# Patient Record
Sex: MALE | Race: WHITE | NOT HISPANIC OR LATINO | Employment: OTHER | URBAN - METROPOLITAN AREA
[De-identification: names, ages, dates, MRNs, and addresses within clinical notes are randomized per-mention and may not be internally consistent; named-entity substitution may affect disease eponyms.]

---

## 2021-05-29 ENCOUNTER — RECORDS - HEALTHEAST (OUTPATIENT)
Dept: ADMINISTRATIVE | Facility: CLINIC | Age: 71
End: 2021-05-29

## 2022-07-26 ENCOUNTER — TELEPHONE (OUTPATIENT)
Dept: OTOLARYNGOLOGY | Facility: CLINIC | Age: 72
End: 2022-07-26

## 2022-07-26 ENCOUNTER — TRANSCRIBE ORDERS (OUTPATIENT)
Dept: OTHER | Age: 72
End: 2022-07-26

## 2022-07-26 DIAGNOSIS — S04.60XD: Primary | ICD-10-CM

## 2022-07-26 NOTE — TELEPHONE ENCOUNTER
[facility] Call Center    Phone Message    May a detailed message be left on voicemail: yes     Reason for Call: Appointment Intake    Referring Provider Name: Dr Eyad Nichols  Diagnosis and/or Symptoms: Injury of vestibulocochlear nerve      Pt requesting to see Dr. Baugh      Action Taken: Message routed to:  [facility] ENT    Travel Screening: Not Applicable

## 2022-07-29 NOTE — TELEPHONE ENCOUNTER
[facility] Call Center    Phone Message    May a detailed message be left on voicemail: yes     Reason for Call: Other: Pt calling back stating that he hasn't heard from clinic. States that Dr. Nichols has also sent information to Dr. Baugh. Please advise.     Action Taken: Message routed to:  [facility] ENT    Travel Screening: Not Applicable

## 2022-08-16 ENCOUNTER — TELEPHONE (OUTPATIENT)
Dept: OTOLARYNGOLOGY | Facility: CLINIC | Age: 72
End: 2022-08-16

## 2022-08-16 NOTE — TELEPHONE ENCOUNTER
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 Baugh. Per protocol, Dr. Baugh does not see for Dx on ref. Pt has called about getting scheduled and has gotten no call back on how to move forward with making appt. He was told that he would hear back last Friday. Please review and call pt to schedule accordingly.     Thank you     Action Taken: Message routed to:  [facility] ent    Travel Screening: Not Applicable

## 2022-08-17 NOTE — TELEPHONE ENCOUNTER
LVM to schedule next new with Dr. Baugh with [facility] prior.       appt notes : Injury of vestibulocochlear nerve, unspecified laterality, subsequent encounter, Hyperacusis and tinnitus with poor word recognition- Referred by  Dr Eyad Nichols @ [facility]. [facility] prior     Gave call center number

## 2022-08-24 NOTE — TELEPHONE ENCOUNTER
FUTURE VISIT INFORMATION      FUTURE VISIT INFORMATION:    Date: 11/1/22    Time: 9:10AM    Location: [facility]  REFERRAL INFORMATION:    Referring provider:  Dr Eyad Nichols    Referring providers clinic:  [facility]     Reason for visit/diagnosis  New per pt-ref, Injury of vestibulocochlear nerve, unspecified laterality, subsequent encounter, Hyperacusis/tinnitus w/poor word recognition- Referred by  Dr Eyad Nichols @ [facility]. [facility] prior per [facility]    RECORDS REQUESTED FROM:       Clinic name Comments Records Status Imaging Status   [facility]  5/2/22 note from Eyad Nichols MD   Care everywhere     Audiology [facility]   5/3/22 audiogram  Scanned in Care everywhere     [facility] imaging   MRN: [id number] 6/24/22 MR Head Brain IAC  Care everywhere  req 10/7/22 - PACS                        10/7/22 7:43AM sent a fax to [facility] for images - [facility]   10/23/22 9:33AM images received in PACS - [facility]

## 2022-10-27 DIAGNOSIS — Z01.10 ENCOUNTER FOR HEARING TEST: Primary | ICD-10-CM

## 2022-10-31 ENCOUNTER — OFFICE VISIT (OUTPATIENT)
Dept: AUDIOLOGY | Facility: CLINIC | Age: 72
End: 2022-10-31
Payer: COMMERCIAL

## 2022-10-31 DIAGNOSIS — H90.3 SENSORINEURAL HEARING LOSS, BILATERAL: Primary | ICD-10-CM

## 2022-10-31 PROCEDURE — 92550 TYMPANOMETRY & REFLEX THRESH: CPT | Mod: 52 | Performed by: AUDIOLOGIST

## 2022-10-31 PROCEDURE — 92557 COMPREHENSIVE HEARING TEST: CPT | Performed by: AUDIOLOGIST

## 2022-10-31 NOTE — PROGRESS NOTES
AUDIOLOGY REPORT    SUMMARY: Audiology visit completed. See audiogram for results.      RECOMMENDATIONS: Follow-up with ENT.    Jose Dueñas, [facility]  Licensed Audiologist  [place] #[id number]

## 2022-11-01 ENCOUNTER — OFFICE VISIT (OUTPATIENT)
Dept: OTOLARYNGOLOGY | Facility: CLINIC | Age: 72
End: 2022-11-01
Payer: COMMERCIAL

## 2022-11-01 ENCOUNTER — PRE VISIT (OUTPATIENT)
Dept: OTOLARYNGOLOGY | Facility: CLINIC | Age: 72
End: 2022-11-01

## 2022-11-01 VITALS
BODY MASS INDEX: 27.35 KG/M2 | SYSTOLIC BLOOD PRESSURE: 142 MMHG | OXYGEN SATURATION: 97 % | HEIGHT: 70 IN | WEIGHT: 191 LBS | TEMPERATURE: 97.4 F | HEART RATE: 72 BPM | DIASTOLIC BLOOD PRESSURE: 80 MMHG

## 2022-11-01 DIAGNOSIS — Z71.89 ENCOUNTER FOR HEARING AID CONSULTATION: ICD-10-CM

## 2022-11-01 DIAGNOSIS — H93.213 AUDITORY RECRUITMENT OF BOTH EARS: ICD-10-CM

## 2022-11-01 DIAGNOSIS — H90.3 SENSORINEURAL HEARING LOSS, BILATERAL: Primary | ICD-10-CM

## 2022-11-01 PROCEDURE — 99204 OFFICE O/P NEW MOD 45 MIN: CPT | Performed by: OTOLARYNGOLOGY

## 2022-11-01 RX ORDER — RABEPRAZOLE SODIUM 20 MG/1
20 TABLET, DELAYED RELEASE ORAL
COMMUNITY
Start: 2022-10-24

## 2022-11-01 RX ORDER — LORATADINE/PSEUDOEPHEDRINE 10MG-240MG
1 TABLET, EXTENDED RELEASE 24 HR ORAL DAILY
COMMUNITY
Start: 2022-08-04

## 2022-11-01 RX ORDER — FAMOTIDINE 40 MG/1
40 TABLET, FILM COATED ORAL
COMMUNITY
Start: 2022-09-28

## 2022-11-01 RX ORDER — UBIDECARENONE 200 MG
CAPSULE ORAL
COMMUNITY
Start: 2021-07-08

## 2022-11-01 RX ORDER — EPINEPHRINE 0.3 MG/.3ML
INJECTION SUBCUTANEOUS
COMMUNITY
Start: 2022-06-05

## 2022-11-01 RX ORDER — BACLOFEN 20 MG
TABLET ORAL
COMMUNITY
Start: 2021-07-08

## 2022-11-01 RX ORDER — ATORVASTATIN CALCIUM 20 MG/1
TABLET, FILM COATED ORAL
COMMUNITY
Start: 2022-10-24

## 2022-11-01 RX ORDER — SILDENAFIL 100 MG/1
TABLET, FILM COATED ORAL
COMMUNITY
Start: 2022-09-09

## 2022-11-01 ASSESSMENT — PAIN SCALES - GENERAL: PAINLEVEL: NO PAIN (0)

## 2022-11-01 NOTE — LETTER
Hearing Aid Medical Clearance    Luis Palma  November 1, 2022        This patient has received a medical examination and may be considered a suitable candidate for a hearing aid.         Physician:__________________________________________________      Dr. Ileana Baugh

## 2022-11-01 NOTE — LETTER
2022       RE: Luis Palma  [address] [place] [address]     Dear Colleague,    Thank you for referring your patient, Luis Palma, to the [facility] EAR NOSE AND THROAT CLINIC [place] [facility]. Please see a copy of my visit note below.      Neurotology Clinic      Name: Luis Palma  MRN: [id number]  Age: 72 year old  : 1950  Referring provider: Eyad Nichols  2022      Chief Complaint:   Consultation    History of Present Illness:   Luis Palma is a 72 year old male with a history of bilateral SNHL who presents for consultation regarding injury to vestibulocochlear nerve and tinnitus with poor word recognition. Consultation was requested by Eyad Nichols. The patient was evaluated by Dr. Nichols on 22 and endorsed tinnitus, decreased hearing, and hearing static after going to a gun range on May 1st. He had been wearing his hearing aids at this time, as well as hearing protection head phones. Patient was started on prednisone. He was re-evaluated on 22 after his audiogram demonstrated poor word recognition: 52% on left, 60% on right. Based on that audiogram, his SNHL was stable compared to 2022, but no previous word rec scores to compare to.  At this time, he had no improvement of symptoms after prednisone and reported difficulty understanding his wife as well as others. He also heard only buzzing when listening to music.     Today, he says he feels both his overall hearing in terms of volume is worsened, as well as speech discrimination. Conversations are difficult to understand due to a buzzing sound with speech, and music sounds off pitch and is also drowned out by buzzing sounds. He has been wearing his hearing aids for the past 8 years. Denies any changes to facial sensation or movement. Denies vertigo. Denies new dizziness. Of note, patient's parents began wearing hearing aids in  "their 70s. He believes that his mother currently has better word understanding than him.    Review of Systems:   Pertinent items are noted in HPI or as in patient entered ROS below, remainder of complete ROS is negative.    ENT ROS 11/1/2022   Ears, Nose, Throat: Ringing/noise in ears        Active Medications:     Current Outpatient Medications:      atorvastatin (LIPITOR) 20 MG tablet, , Disp: , Rfl:      coenzyme Q-10 200 MG CAPS capsule, , Disp: , Rfl:      EPINEPHrine (ANY BX GENERIC EQUIV) 0.3 MG/0.3ML injection 2-pack, INJECT INTO LATERAL THIGH IF NEEDED FOR LIFE THREATENING ALLERGIC REACTIONS, Disp: , Rfl:      famotidine (PEPCID) 40 MG tablet, Take 40 mg by mouth, Disp: , Rfl:      LORATADINE-D 24HR  MG 24 hr tablet, Take 1 tablet by mouth daily, Disp: , Rfl:      Magnesium Oxide 500 MG TABS, , Disp: , Rfl:      RABEprazole (ACIPHEX) 20 MG EC tablet, Take 20 mg by mouth, Disp: , Rfl:      sildenafil (VIAGRA) 100 MG tablet, Take 1 tablet by mouth once daily if needed for Erectile Dysfunction. Take 30 minutes to 4 hours before sexual activity. Max 100 mg per 24 h, Disp: , Rfl:      vitamin D3 (CHOLECALCIFEROL) 125 MCG (5000 UT) tablet, Take 5,000 Units by mouth, Disp: , Rfl:       Allergies:   Doxycycline and Wasp venom protein      Past Medical History:  No past medical history on file.  There is no problem list on file for this patient.       Past Surgical History:  No past surgical history on file.    Family History:   No family history on file.      Social History:         Physical Exam:   BP (!) 142/80   Pulse 72   Temp 97.4  F (36.3  C)   Ht 1.778 m (5' 10\")   Wt 86.6 kg (191 lb)   SpO2 97%   BMI 27.41 kg/m         Constitutional:  The patient was unaccompanied, well-groomed, and in no acute distress.     Skin: Normal:  warm and pink without rash   Neurologic: Alert and oriented x 3.  CN's III-XII within normal limits.  Voice normal.    Psychiatric: The patient's affect was calm, " cooperative, and appropriate.     Communication:  Normal; communicates verbally, normal voice quality.   Respiratory: Breathing comfortably without stridor or exertion of accessory muscles.    Head/Face:  No lesions or scars. CN V and VII intact bilaterally.    Eyes: Pupils were equal and reactive.  Extraocular movement intact.     Ears: Pinnae and tragus non-tender.  EAC's and TM's were clear.      Right ear: Normal TM. No signs of infection.  Left ear: Normal TM. No signs of infection.    Audiogram:  AUDIOGRAM: He underwent an audiogram 10/31/22. This demonstrated:      Right: Speech reception threshold is 45 dB with 48% word recognition at 85 dB HL . Tympanogram Ad type   Left: Speech reception threshold is 50 dB with 60% word recognition at 85 dB HL. Tympanogram A type     Audiogram was independently reviewed    Imaging:  MR Head Brain IAC (6/24/22)  Impression:  1.  Normal MRI of the internal auditory canals and labyrinthine structures.    Outside records from [facility] were independently reviewed.      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 On exam, he is stable. We talked about the range of etiologies for this and the impact of acoustic trauma.  We discussed options to improve his hearing in different circumstances.  I feel that he would benefit from updated hearing aids. Patient is medically cleared for this. If there is ongoing hearing changes, he can certainly begin to consider cochlear implantation and we discussed the process for this.  He should continue to have annual audiometric follow up, sooner if new changes arise.   Follow up as needed.    Scribe Disclosure:  I, Olivia Wesley, am serving as a scribe to document services personally performed by Ileana Baugh MD at this visit, based upon the provider's statements to me. All  documentation has been reviewed by the aforementioned provider prior to being entered into the official medical record.    The documentation recorded by the scribe accurately reflects the services I personally performed and the decisions made by me.    Ileana Baugh MD  Otology & Neurotology  [facility]

## 2022-11-01 NOTE — NURSING NOTE
"Chief Complaint   Patient presents with     Consult     Injury to Vestibularcochlear nerve      Blood pressure (!) 142/80, pulse 72, temperature 97.4  F (36.3  C), height 1.778 m (5' 10\"), weight 86.6 kg (191 lb), SpO2 97 %.    Handy Abbasi LPN    "

## 2022-11-01 NOTE — PROGRESS NOTES
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 Consultation was requested by Eyad Nichols. The patient was evaluated by Dr. Nichols on 22 and endorsed tinnitus, decreased hearing, and hearing static after going to a gun range on May 1st. He had been wearing his hearing aids at this time, as well as hearing protection head phones. Patient was started on prednisone. He was re-evaluated on 22 after his audiogram demonstrated poor word recognition: 52% on left, 60% on right. Based on that audiogram, his SNHL was stable compared to 2022, but no previous word rec scores to compare to.  At this time, he had no improvement of symptoms after prednisone and reported difficulty understanding his wife as well as others. He also heard only buzzing when listening to music.     Today, he says he feels both his overall hearing in terms of volume is worsened, as well as speech discrimination. Conversations are difficult to understand due to a buzzing sound with speech, and music sounds off pitch and is also drowned out by buzzing sounds. He has been wearing his hearing aids for the past 8 years. Denies any changes to facial sensation or movement. Denies vertigo. Denies new dizziness. Of note, patient's parents began wearing hearing aids in their 70s. He believes that his mother currently has better word understanding than him.    Review of Systems:   Pertinent items are noted in HPI or as in patient entered ROS below, remainder of complete ROS is negative.   [facility] ROS 2022   Ears, Nose, Throat: Ringing/noise in ears        Active Medications:     Current  "Outpatient Medications:      atorvastatin (LIPITOR) 20 MG tablet, , Disp: , Rfl:      coenzyme Q-10 200 MG CAPS capsule, , Disp: , Rfl:      EPINEPHrine (ANY BX GENERIC EQUIV) 0.3 MG/0.3ML injection 2-pack, INJECT INTO LATERAL THIGH IF NEEDED FOR LIFE THREATENING ALLERGIC REACTIONS, Disp: , Rfl:      famotidine (PEPCID) 40 MG tablet, Take 40 mg by mouth, Disp: , Rfl:      LORATADINE-D 24HR  MG 24 hr tablet, Take 1 tablet by mouth daily, Disp: , Rfl:      Magnesium Oxide 500 MG TABS, , Disp: , Rfl:      RABEprazole (ACIPHEX) 20 MG EC tablet, Take 20 mg by mouth, Disp: , Rfl:      sildenafil (VIAGRA) 100 MG tablet, Take 1 tablet by mouth once daily if needed for Erectile Dysfunction. Take 30 minutes to 4 hours before sexual activity. Max 100 mg per 24 h, Disp: , Rfl:      vitamin D3 (CHOLECALCIFEROL) 125 MCG (5000 UT) tablet, Take 5,000 Units by mouth, Disp: , Rfl:       Allergies:   Doxycycline and Wasp venom protein      Past Medical History:  No past medical history on file.  There is no problem list on file for this patient.       Past Surgical History:  No past surgical history on file.    Family History:   No family history on file.      Social History:         Physical Exam:   BP (!) 142/80   Pulse 72   Temp 97.4  F (36.3  C)   Ht 1.778 m (5' 10\")   Wt 86.6 kg (191 lb)   SpO2 97%   BMI 27.41 kg/m         Constitutional:  The patient was unaccompanied, well-groomed, and in no acute distress.     Skin: Normal:  warm and pink without rash   Neurologic: Alert and oriented x 3.  CN's III-XII within normal limits.  Voice normal.    Psychiatric: The patient's affect was calm, cooperative, and appropriate.     Communication:  Normal; communicates verbally, normal voice quality.   Respiratory: Breathing comfortably without stridor or exertion of accessory muscles.    Head/Face:  No lesions or scars. CN V and VII intact bilaterally.    Eyes: Pupils were equal and reactive.  Extraocular movement intact.   "   Ears: Pinnae and tragus non-tender.  EAC's and TM's were clear.      Right ear: Normal TM. No signs of infection.  Left ear: Normal TM. No signs of infection.    Audiogram:  AUDIOGRAM: He underwent an audiogram 10/31/22. This demonstrated:      Right: Speech reception threshold is 45 dB with 48% word recognition at 85 dB HL . Tympanogram Ad type   Left: Speech reception threshold is 50 dB with 60% word recognition at 85 dB HL. Tympanogram A type     Audiogram was independently reviewed    Imaging:  MR Head Brain IAC (6/24/22)  Impression:  1.  Normal MRI of the internal auditory canals and labyrinthine structures.    Outside records from [facility] were independently reviewed.      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 On exam, he is stable. We talked about the range of etiologies for this and the impact of acoustic trauma.  We discussed options to improve his hearing in different circumstances.  I feel that he would benefit from updated hearing aids. Patient is medically cleared for this. If there is ongoing hearing changes, he can certainly begin to consider cochlear implantation and we discussed the process for this.  He should continue to have annual audiometric follow up, sooner if new changes arise.   Follow up as needed.    Scribe Disclosure:  I, Olivia Wesley, am serving as a scribe to document services personally performed by Ileana Baugh MD at this visit, based upon the provider's statements to me. All documentation has been reviewed by the aforementioned provider prior to being entered into the official medical record.    The documentation recorded by the scribe accurately reflects the services I personally performed and the decisions made by me.    Ileana Baugh MD  Otology & Neurotology  [facility]

## 2022-11-01 NOTE — PATIENT INSTRUCTIONS
You were seen in the ENT Clinic today by Dr. Baugh. If you have any questions or concerns after your appointment, please contact us (see below)      2.  Hearing aid consult, we will work to find a location closer to you.   3. Please plan to return to clinic as needed.         How to Contact Us:  Send a [employer] message to your provider. Our team will respond to you via [employer]. Occasionally, we will need to call you to get further information.  For urgent matters (Monday-Friday), call the ENT Clinic: [phone] and speak with a call center team member - they will route your call appropriately.   If you'd like to speak directly with a nurse, please find our contact information below. We do our best to check voicemail frequently throughout the day, and will work to call you back within 1-2 days. For urgent matters, please use the general clinic phone numbers listed above.      Citlalli MCDERMOTT RN  ENT RN Care Coordinator  Direct: [phone]    Hilda DELATORRE LPN  Direct: [phone]